# Patient Record
(demographics unavailable — no encounter records)

---

## 2025-07-03 NOTE — ASSESSMENT
[FreeTextEntry1] : 07/03/2025: NW  ankle x-rays reviewed reveals no fx/abnormalities. Underlying pathology reviewed and treatment options discussed. Start PT and HEP to improve mechanics and reduce pain. Obtain MRI RT ankle.  Air sport brace prescription provided.  Activity modification as tolerated. Questions addressed. Follow up after MRI.   The documentation recorded by the scribe accurately reflects the service I personally performed and the decisions made by me. I, Susie Wells, attest that this documentation has been prepared under the direction and in the presence of Provider Lino Gardner MD.   The patient was seen by Lino Gardner MD.

## 2025-07-03 NOTE — HISTORY OF PRESENT ILLNESS
[Result of repetitive motion] : result of repetitive motion [5] : 5 [4] : 4 [Dull/Aching] : dull/aching [Sharp] : sharp [Shooting] : shooting [Leisure] : leisure [Social interactions] : social interactions [Full time] : Work status: full time [de-identified] : 07/03/2025: 23 yo F presents today for right foot/ankle pain after she twisted it playing volleyball. She went to an  where x-rays were done with unremarkable findings. She has bruising. Has sprained that same ankle in the past.    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

## 2025-07-03 NOTE — DISCUSSION/SUMMARY
[de-identified] : The patient was advised of the diagnosis. The natural history of the pathology was explained in full to the patient in layman's terms. The risks and benefits of surgical and non-surgical treatment alternatives were explained in full to the patient. All questions were answered.